# Patient Record
Sex: FEMALE | Race: WHITE
[De-identification: names, ages, dates, MRNs, and addresses within clinical notes are randomized per-mention and may not be internally consistent; named-entity substitution may affect disease eponyms.]

---

## 2019-12-14 ENCOUNTER — HOSPITAL ENCOUNTER (EMERGENCY)
Dept: HOSPITAL 56 - MW.ED | Age: 39
Discharge: OTHER INSTITUTION | End: 2019-12-14
Payer: COMMERCIAL
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
## 2019-12-14 DIAGNOSIS — M79.651: ICD-10-CM
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
## 2019-12-14 DIAGNOSIS — S09.90XA: ICD-10-CM

## 2019-12-14 DIAGNOSIS — M79.604: ICD-10-CM

## 2019-12-14 DIAGNOSIS — S00.03XA: Primary | ICD-10-CM
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
